# Patient Record
Sex: MALE | URBAN - METROPOLITAN AREA
[De-identification: names, ages, dates, MRNs, and addresses within clinical notes are randomized per-mention and may not be internally consistent; named-entity substitution may affect disease eponyms.]

---

## 2019-06-24 ENCOUNTER — EMERGENCY (EMERGENCY)
Facility: HOSPITAL | Age: 24
LOS: 1 days | Discharge: HOME | End: 2019-06-24
Admitting: EMERGENCY MEDICINE
Payer: MEDICAID

## 2019-06-24 VITALS
DIASTOLIC BLOOD PRESSURE: 59 MMHG | HEART RATE: 56 BPM | RESPIRATION RATE: 15 BRPM | SYSTOLIC BLOOD PRESSURE: 110 MMHG | OXYGEN SATURATION: 100 % | TEMPERATURE: 98 F

## 2019-06-24 VITALS
RESPIRATION RATE: 16 BRPM | HEART RATE: 75 BPM | TEMPERATURE: 98 F | DIASTOLIC BLOOD PRESSURE: 72 MMHG | OXYGEN SATURATION: 100 % | SYSTOLIC BLOOD PRESSURE: 121 MMHG

## 2019-06-24 PROCEDURE — 99283 EMERGENCY DEPT VISIT LOW MDM: CPT

## 2019-06-24 PROCEDURE — 73610 X-RAY EXAM OF ANKLE: CPT | Mod: 26,50

## 2019-06-24 RX ORDER — IBUPROFEN 200 MG
600 TABLET ORAL ONCE
Refills: 0 | Status: COMPLETED | OUTPATIENT
Start: 2019-06-24 | End: 2019-06-24

## 2019-06-24 RX ADMIN — Medication 600 MILLIGRAM(S): at 13:24

## 2019-06-24 NOTE — ED PROVIDER NOTE - CLINICAL SUMMARY MEDICAL DECISION MAKING FREE TEXT BOX
22 yo male c no sig pmhx presents to Ed c/o b/l ankle pain x 3.5 weeks s/p strenuous activity.  Likely sprain and pt has not been keeping off his feet which has been exacerbating.  will get xrays, nsaids and ortho follow up.

## 2019-06-24 NOTE — ED PROVIDER NOTE - OBJECTIVE STATEMENT
22 yo male c no sig pmhx presents to Ed c/o b/l ankle pain x 3.5 weeks.  Pt states initially he had pain in his right ankle/foot and now its progressed to his left knee.  Pt states at the time was in a dance competition when he first started to fell the pain and then had martial arts so never actually rested his feet.  States notices swelling worse at the end of the day, but has been standing on his feet for long periods of time.  Denies any calf pain, cp, sob, fever or any other joint pain.

## 2019-06-24 NOTE — ED ADULT TRIAGE NOTE - CHIEF COMPLAINT QUOTE
pt comes to ED for knee and foot pain/ swelling x 3 1/2 weeks pt denies going to PCP. pt VSS pt appears comfortable NAD

## 2019-06-28 ENCOUNTER — APPOINTMENT (OUTPATIENT)
Dept: ORTHOPEDIC SURGERY | Facility: CLINIC | Age: 24
End: 2019-06-28
Payer: MEDICAID

## 2019-06-28 VITALS — SYSTOLIC BLOOD PRESSURE: 125 MMHG | HEART RATE: 87 BPM | DIASTOLIC BLOOD PRESSURE: 81 MMHG

## 2019-06-28 VITALS — HEIGHT: 71 IN | WEIGHT: 202 LBS | BODY MASS INDEX: 28.28 KG/M2

## 2019-06-28 DIAGNOSIS — M25.572 PAIN IN LEFT ANKLE AND JOINTS OF LEFT FOOT: ICD-10-CM

## 2019-06-28 DIAGNOSIS — Z78.9 OTHER SPECIFIED HEALTH STATUS: ICD-10-CM

## 2019-06-28 DIAGNOSIS — M25.571 PAIN IN RIGHT ANKLE AND JOINTS OF RIGHT FOOT: ICD-10-CM

## 2019-06-28 PROCEDURE — 99204 OFFICE O/P NEW MOD 45 MIN: CPT

## 2019-06-28 RX ORDER — DICLOFENAC SODIUM 75 MG/1
75 TABLET, DELAYED RELEASE ORAL
Qty: 1 | Refills: 1 | Status: ACTIVE | COMMUNITY
Start: 2019-06-28 | End: 1900-01-01

## 2019-06-28 RX ORDER — IBUPROFEN 200 MG/1
TABLET, COATED ORAL
Refills: 0 | Status: ACTIVE | COMMUNITY

## 2019-06-28 NOTE — PHYSICAL EXAM
[de-identified] : Tenderness to palpation over bilateral posterior tibial tendons\par Neurologically intact distally and symmetrical\par otherwise, 5 out of 5 motor strength, sensation is intact and symmetrical full range of motion flexion extension and rotation, no palpatory tenderness full range of motion of hips knees shoulders and elbows (all four extremities), no atrophy, negative straight leg raise, no pathological reflexes, no swelling, normal ambulation, no apparent distress skin is intact, no palpable lymph nodes, no upper or lower extremity instability, alert and oriented x3 and normal mood. Normal finger-to nose test.  [de-identified] : [facility]\par \par EXAM: RAD ANKLE MIN 3 VIEWS BILAT \par \par PROCEDURE DATE: Jun 24 2019 \par \par INTERPRETATION: CLINICAL INDICATION: bilateral ankle pain and swelling \par \par EXAM: \par Frontal, oblique, and lateral views of both ankles from 6/24/2019 at 1401. \par No similar prior studies available for comparison. \par \par IMPRESSION: \par No fractures or dislocations. \par \par Congruent ankle mortise with smooth and intact talar dome. \par \par Preserved remaining visualized joint spaces and no joint margin erosions. \par \par No calcaneal spurring and unremarkable appearing Achilles tendon shadow. \par \par No lytic or blastic lesions. \par \par \par BRYON VAN M.D., ATTENDING RADIOLOGIST \par This document has been electronically signed. Jun 24 2019 4:20PM \par \par \par \par \par

## 2019-06-28 NOTE — DISCUSSION/SUMMARY
[de-identified] : Acute bilateral ankle pain\par Diclofenac\par Physical therapy prescription written\par He will followup with Dr. Duong or another orthopedic specialist in 4 weeks. If no better\par Options discussed.\par All questions answered.\par The patient is in full agreement with the plan, \par and happy with the visit.\par --------------\par This note was done with a voice recognition transcription \par software and any typos are related to this rather than medical error.\par \par

## 2019-06-28 NOTE — HISTORY OF PRESENT ILLNESS
[de-identified] : Patient is a 23-year-old male comes in today complaining of bilateral Ankle pain \par X1 month.  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 \par This history is in addition to the intake form that I personally reviewed.

## 2019-07-22 ENCOUNTER — APPOINTMENT (OUTPATIENT)
Dept: ORTHOPEDIC SURGERY | Facility: CLINIC | Age: 24
End: 2019-07-22

## 2021-01-13 ENCOUNTER — APPOINTMENT (OUTPATIENT)
Dept: PULMONOLOGY | Facility: CLINIC | Age: 26
End: 2021-01-13
Payer: COMMERCIAL

## 2021-01-13 VITALS
DIASTOLIC BLOOD PRESSURE: 84 MMHG | HEIGHT: 71 IN | RESPIRATION RATE: 15 BRPM | OXYGEN SATURATION: 98 % | BODY MASS INDEX: 29.68 KG/M2 | HEART RATE: 74 BPM | SYSTOLIC BLOOD PRESSURE: 129 MMHG | TEMPERATURE: 97.9 F | WEIGHT: 212 LBS

## 2021-01-13 DIAGNOSIS — R06.83 SNORING: ICD-10-CM

## 2021-01-13 PROCEDURE — 99072 ADDL SUPL MATRL&STAF TM PHE: CPT

## 2021-01-13 PROCEDURE — 99204 OFFICE O/P NEW MOD 45 MIN: CPT

## 2021-01-13 NOTE — PHYSICAL EXAM
[General Appearance - In No Acute Distress] : no acute distress [Normal Conjunctiva] : the conjunctiva exhibited no abnormalities [Neck Appearance] : the appearance of the neck was normal [Heart Rate And Rhythm] : heart rate was normal and rhythm regular [Heart Sounds] : normal S1 and S2 [Respiration, Rhythm And Depth] : normal respiratory rhythm and effort [Auscultation Breath Sounds / Voice Sounds] : lungs were clear to auscultation bilaterally [Involuntary Movements] : no involuntary movements were seen [Nail Clubbing] : no clubbing of the fingernails [Non-Pitting] : non-pitting [Skin Color & Pigmentation] : normal skin color and pigmentation [No Focal Deficits] : no focal deficits [Oriented To Time, Place, And Person] : oriented to person, place, and time [II] : II [] : 2+ [Normal Oropharynx] : abnormal oropharynx

## 2021-01-13 NOTE — REVIEW OF SYSTEMS
[EDS: ESS=____] : daytime somnolence: ESS=[unfilled] [Fatigue] : fatigue [Snoring] : snoring [Witnessed Apneas] : witnessed apnea [Heartburn] : heartburn [Negative] : Endocrine [Nasal Congestion] : no nasal congestion [Difficulty Initiating Sleep] : no difficulty falling asleep [Difficulty Maintaining Sleep] : no difficulty maintaining sleep [Lower Extremity Discomfort] : no lower extremity discomfort [Unusual Sleep Behavior] : no unusual sleep behavior [Cataplexy] :  no cataplexy

## 2021-01-13 NOTE — HISTORY OF PRESENT ILLNESS
[FreeTextEntry1] : 24yo M with no significant past medical history presenting for evaluation of possible sleep disorder. Relatives have noticed very severe snoring for the past several years. Weight has been fluctuating in past year. He sometimes has nocturnal gasping and choking as well as witnessed apneas. Patient denies sleep fragmentation but wakes up still sleepy. He is also sleepy during the day especially when engaged in passive activities. He denies am headaches or dry mouth. Denies any nasal congestion on a chronic basis.

## 2021-01-13 NOTE — CONSULT LETTER
[Dear  ___] : Dear  [unfilled], [Consult Letter:] : I had the pleasure of evaluating your patient, [unfilled]. [Please see my note below.] : Please see my note below. [Consult Closing:] : Thank you very much for allowing me to participate in the care of this patient.  If you have any questions, please do not hesitate to contact me. [Sincerely,] : Sincerely, [FreeTextEntry3] : Brisa Rodriguez MD

## 2021-01-24 NOTE — ED PROVIDER NOTE - NSFOLLOWUPINSTRUCTIONS_ED_ALL_ED_FT
No take motrin 600mg every  8 hours.  Rest, ice, elevate.  follow up with an orthopedic within 1-2 weeks.   Return to ED for any worsening pain, swelling or fever.

## 2021-02-03 ENCOUNTER — FORM ENCOUNTER (OUTPATIENT)
Age: 26
End: 2021-02-03

## 2021-02-22 ENCOUNTER — APPOINTMENT (OUTPATIENT)
Dept: SLEEP CENTER | Facility: CLINIC | Age: 26
End: 2021-02-22
Payer: MEDICAID

## 2021-02-22 ENCOUNTER — OUTPATIENT (OUTPATIENT)
Dept: OUTPATIENT SERVICES | Facility: HOSPITAL | Age: 26
LOS: 1 days | End: 2021-02-22
Payer: COMMERCIAL

## 2021-02-22 PROCEDURE — 95806 SLEEP STUDY UNATT&RESP EFFT: CPT

## 2021-02-22 PROCEDURE — 95806 SLEEP STUDY UNATT&RESP EFFT: CPT | Mod: 26

## 2021-02-26 DIAGNOSIS — G47.33 OBSTRUCTIVE SLEEP APNEA (ADULT) (PEDIATRIC): ICD-10-CM

## 2021-03-02 ENCOUNTER — APPOINTMENT (OUTPATIENT)
Dept: PULMONOLOGY | Facility: CLINIC | Age: 26
End: 2021-03-02
Payer: MEDICAID

## 2021-03-02 DIAGNOSIS — G47.33 OBSTRUCTIVE SLEEP APNEA (ADULT) (PEDIATRIC): ICD-10-CM

## 2021-03-02 DIAGNOSIS — E66.9 OBESITY, UNSPECIFIED: ICD-10-CM

## 2021-03-02 PROCEDURE — 99214 OFFICE O/P EST MOD 30 MIN: CPT | Mod: 95

## 2021-03-02 NOTE — REASON FOR VISIT
[Follow-Up] : a follow-up visit [Home] : at home, [unfilled] , at the time of the visit. [Medical Office: ([facility])___] : at the medical office located in  [Verbal consent obtained from patient] : the patient, [unfilled] [FreeTextEntry1] : ESEQUIEL

## 2021-03-02 NOTE — ASSESSMENT
[FreeTextEntry1] : 24yo M with no significant past medical history presenting for management of sleep disorder. He has severe snoring, restless sleep. He had a recent HSAt which was indicative  of severe ESEQUIEL -- DOMONIQUE 54.7/h. Sleep disordered breathing events were associated with moderate decreases in O2. He wishes to pursue therapy and is very committed to losing weight healthfully through diet and exercise. He used to do a lot of martial arts in the past, but his job at [employer] has been very busy lately. \par \par I explained the relationship between obesity and obstructive sleep apnea and the role of weight loss in improving severity of ESEQUIEL.\par  He wishes to pursue therapy and is very committed to losing weight healthfully through diet and exercise\par \par Advised starting CPAP therapy\par Will order CPAP titration study\par This was explained in detail to the patient\par I explained the rationale for treatment of ESEQUIEL -- to improve quality of life, daytime function and to decrease the cardiometabolic and other medical risks that are associated with untreated ESEQUIEL. The patient verbalized understanding.\par I also explained that the patient can expect a follow up call once results of the above study becomes available.\par

## 2021-03-02 NOTE — REVIEW OF SYSTEMS
[EDS: ESS=____] : daytime somnolence: ESS=[unfilled] [Fatigue] : fatigue [Snoring] : snoring [Witnessed Apneas] : witnessed apnea [Heartburn] : heartburn [Negative] : Psychiatric [Nasal Congestion] : no nasal congestion [Difficulty Initiating Sleep] : no difficulty falling asleep [Difficulty Maintaining Sleep] : no difficulty maintaining sleep [Lower Extremity Discomfort] : no lower extremity discomfort [Unusual Sleep Behavior] : no unusual sleep behavior [Cataplexy] :  no cataplexy

## 2021-03-02 NOTE — HISTORY OF PRESENT ILLNESS
[FreeTextEntry1] : 26yo M with no significant past medical history presenting for management of sleep disorder. He has severe snoring, restless sleep. He had a recent HSAt which was indicative  of severe ESEQUIEL -- DOMONIQUE 54.7/h. Sleep disordered breathing events were associated with moderate decreases in O2. He wishes to pursue therapy and is very committed to losing weight healthfully through diet and exercise. He used to do a lot of martial arts in the past, but his job at [employer] has been very busy lately.

## 2021-04-20 ENCOUNTER — FORM ENCOUNTER (OUTPATIENT)
Age: 26
End: 2021-04-20

## 2021-04-24 ENCOUNTER — APPOINTMENT (OUTPATIENT)
Dept: DISASTER EMERGENCY | Facility: CLINIC | Age: 26
End: 2021-04-24

## 2021-04-27 ENCOUNTER — APPOINTMENT (OUTPATIENT)
Dept: SLEEP CENTER | Facility: CLINIC | Age: 26
End: 2021-04-27
Payer: MEDICAID

## 2021-04-27 PROCEDURE — 95811 POLYSOM 6/>YRS CPAP 4/> PARM: CPT | Mod: 26

## 2021-07-26 ENCOUNTER — NON-APPOINTMENT (OUTPATIENT)
Age: 26
End: 2021-07-26

## 2021-08-04 ENCOUNTER — TRANSCRIPTION ENCOUNTER (OUTPATIENT)
Age: 26
End: 2021-08-04

## 2021-08-11 ENCOUNTER — TRANSCRIPTION ENCOUNTER (OUTPATIENT)
Age: 26
End: 2021-08-11

## 2021-08-31 ENCOUNTER — APPOINTMENT (OUTPATIENT)
Dept: PULMONOLOGY | Facility: CLINIC | Age: 26
End: 2021-08-31

## 2025-01-09 NOTE — ASSESSMENT
[FreeTextEntry1] : 26yo M with no significant past medical history presenting for evaluation of possible sleep disorder. Relatives have noticed very severe snoring for the past several years. Weight has been fluctuating in past year. He sometimes has nocturnal gasping and choking as well as witnessed apneas. Patient denies sleep fragmentation but wakes up still sleepy. He is also sleepy during the day especially when engaged in passive activities. He denies am headaches or dry mouth. Denies any nasal congestion on a chronic basis. \par \par Will order PSG for diagnostic purposes \par I explained the rationale for treatment of ESEQUIEL -- to improve quality of life, daytime function and to decrease the cardiometabolic and other medical risks that are associated with untreated ESEQUIEL. The patient verbalized understanding.\par \par I explained the relationship between obesity and obstructive sleep apnea and the role of weight loss in improving severity of ESEQUIEL.\par \par RTC after sleep study to discuss results 21.8